# Patient Record
Sex: FEMALE | Race: WHITE
[De-identification: names, ages, dates, MRNs, and addresses within clinical notes are randomized per-mention and may not be internally consistent; named-entity substitution may affect disease eponyms.]

---

## 2018-06-04 ENCOUNTER — HOSPITAL ENCOUNTER (EMERGENCY)
Dept: HOSPITAL 31 - C.ER | Age: 63
Discharge: HOME | End: 2018-06-04
Payer: COMMERCIAL

## 2018-06-04 VITALS
SYSTOLIC BLOOD PRESSURE: 123 MMHG | TEMPERATURE: 97.9 F | RESPIRATION RATE: 16 BRPM | HEART RATE: 72 BPM | DIASTOLIC BLOOD PRESSURE: 82 MMHG

## 2018-06-04 VITALS — OXYGEN SATURATION: 96 %

## 2018-06-04 VITALS — BODY MASS INDEX: 34.7 KG/M2

## 2018-06-04 DIAGNOSIS — R42: Primary | ICD-10-CM

## 2018-06-04 NOTE — C.PDOC
History Of Present Illness


61 y/o female, employee of hospital, presents to the ER for evaluation of 

lightheadedness after she got stuck in the elevator in the parking garage 

today. Patient states she was stuck in elevator for about 20 minutes. Patient 

states that she did not panic while was stuck in the elevator. She started 

feeling lightheadedand SOB after she exited the elevator. She wants to get 

checked out. Denies having  wheezing, or history of respiratory issues.


Time Seen by Provider: 06/04/18 07:46


Chief Complaint (Nursing): Dizziness/Lightheaded


History Per: Patient


History/Exam Limitations: no limitations


Onset/Duration Of Symptoms: Hrs


Current Symptoms Are (Timing): Still Present


Severity: Moderate





Past Medical History


Reviewed: Historical Data, Nursing Documentation, Vital Signs


Vital Signs: 


 Last Vital Signs











Temp  98.3 F   06/04/18 07:38


 


Pulse  80   06/04/18 07:38


 


Resp  18   06/04/18 08:03


 


BP  137/84   06/04/18 07:38


 


Pulse Ox  96   06/04/18 08:35














- Medical History


PMH: HTN


Surgical History: Cholecystectomy





- [facility] Procedures








COLONOSCOPY (11/20/13)


INFLUENZA VACCINATION (10/07/13)


OTHER OPEN INCISIONAL HERNIA REPAIR WITH GRAFT OR PROSTHESIS (10/07/13)








Family History: States: No Known Family Hx





- Social History


Hx Tobacco Use: No


Hx Alcohol Use: No


Hx Substance Use: No





- Immunization History


Hx Tetanus Toxoid Vaccination: Yes


Hx Influenza Vaccination: Yes


Hx Pneumococcal Vaccination: No





Review Of Systems


Constitutional: Negative for: Fever, Chills, Weakness, Malaise


Eyes: Negative for: Vision Change


Cardiovascular: Positive for: Light Headedness.  Negative for: Chest Pain, 

Palpitations


Respiratory: Positive for: Shortness of Breath.  Negative for: Cough





Physical Exam





- Physical Exam


Appears: Non-toxic, No Acute Distress


Skin: Normal Color, Warm, Dry, No Rash


Head: Atraumatic, Normacephalic


Eye(s): bilateral: Normal Inspection, EOMI


Nose: Normal


Neck: Supple


Chest: Symmetrical


Cardiovascular: Rhythm Regular, No Murmur


Respiratory: Normal Breath Sounds, No Rales, No Rhonchi, No Wheezing


Extremity: Bilateral: Atraumatic, Normal Color And Temperature, Normal ROM


Neurological/Psych: Oriented x3, Normal Speech


Gait: Steady





ED Course And Treatment


O2 Sat by Pulse Oximetry: 96 (RA)


Pulse Ox Interpretation: Normal





Medical Decision Making


Medical Decision Making: 


Patient was stuck in elevator and complains of feeling lightheaded and SOB. She 

refused EKG. Patient given oxygen via nasal cannula. Patient observed in ED for 

an hour. On re-evaluation, patient resting comfortably on stretcher and reports 

feeling much better. Lungs clear bilaterally, normal heart sounds, no headache 

dizziness numbness or weakness. Patient feels comfortable being discharge and 

going to work. 





Disposition


Counseled Patient/Family Regarding: Need For Followup





- Disposition


Disposition: HOME/ ROUTINE


Disposition Time: 08:55


Condition: GOOD


Additional Instructions: 


You were evaluated in our Emergency Department for feeling lightheaded. You are 

stable to return to work. 


Forms:  [email or web address] (English), General Discharge Instructions





- POA


Present On Arrival: None





- Clinical Impression


Clinical Impression: 


 Light-headed feeling








- PA / NP / Resident Statement


MD/DO has reviewed & agrees with the documentation as recorded.





- Scribe Statement


The provider has reviewed the documentation as recorded by the Alexx Gandhi





Provider Attestation





All medical record entries made by the Scribe were at my direction and 

personally dictated by me. I have reviewed the chart and agree that the record 

accurately reflects my personal performance of the history, physical exam, 

medical decision making, and the department course for this patient. I have 

also personally directed, reviewed, and agree with the discharge instructions 

and disposition.